# Patient Record
Sex: MALE | URBAN - METROPOLITAN AREA
[De-identification: names, ages, dates, MRNs, and addresses within clinical notes are randomized per-mention and may not be internally consistent; named-entity substitution may affect disease eponyms.]

---

## 2021-05-18 ENCOUNTER — APPOINTMENT (RX ONLY)
Dept: URBAN - METROPOLITAN AREA CLINIC 111 | Facility: CLINIC | Age: 26
Setting detail: DERMATOLOGY
End: 2021-05-18

## 2021-05-18 DIAGNOSIS — L20.89 OTHER ATOPIC DERMATITIS: ICD-10-CM

## 2021-05-18 DIAGNOSIS — L08.9 LOCAL INFECTION OF THE SKIN AND SUBCUTANEOUS TISSUE, UNSPECIFIED: ICD-10-CM

## 2021-05-18 DIAGNOSIS — L21.8 OTHER SEBORRHEIC DERMATITIS: ICD-10-CM

## 2021-05-18 PROBLEM — L20.84 INTRINSIC (ALLERGIC) ECZEMA: Status: ACTIVE | Noted: 2021-05-18

## 2021-05-18 PROCEDURE — ? ORDER TESTS

## 2021-05-18 PROCEDURE — 99203 OFFICE O/P NEW LOW 30 MIN: CPT

## 2021-05-18 PROCEDURE — ? DIAGNOSIS COMMENT

## 2021-05-18 PROCEDURE — ? COUNSELING

## 2021-05-18 PROCEDURE — ? PRESCRIPTION

## 2021-05-18 PROCEDURE — ? PRESCRIPTION MEDICATION MANAGEMENT

## 2021-05-18 RX ORDER — CICLOPIROX 10 MG/.96ML
SHAMPOO TOPICAL
Qty: 1 | Refills: 11 | Status: ERX | COMMUNITY
Start: 2021-05-18

## 2021-05-18 RX ORDER — HYDROCORTISONE, IODOQUINOL 10; 10 MG/G; MG/G
CREAM TOPICAL
Qty: 1 | Refills: 1 | Status: ERX | COMMUNITY
Start: 2021-05-18

## 2021-05-18 RX ORDER — MOMETASONE FUROATE 1 MG/G
OINTMENT TOPICAL
Qty: 1 | Refills: 2 | Status: ERX | COMMUNITY
Start: 2021-05-18

## 2021-05-18 RX ADMIN — MOMETASONE FUROATE: 1 OINTMENT TOPICAL at 00:00

## 2021-05-18 RX ADMIN — HYDROCORTISONE, IODOQUINOL: 10; 10 CREAM TOPICAL at 00:00

## 2021-05-18 RX ADMIN — CICLOPIROX: 10 SHAMPOO TOPICAL at 00:00

## 2021-05-18 ASSESSMENT — LOCATION DETAILED DESCRIPTION DERM
LOCATION DETAILED: GENITALIA
LOCATION DETAILED: RIGHT MEDIAL FRONTAL SCALP
LOCATION DETAILED: RIGHT ULNAR DORSAL HAND
LOCATION DETAILED: RIGHT CHIN
LOCATION DETAILED: LEFT CHIN

## 2021-05-18 ASSESSMENT — LOCATION SIMPLE DESCRIPTION DERM
LOCATION SIMPLE: GENITALIA
LOCATION SIMPLE: RIGHT HAND
LOCATION SIMPLE: CHIN
LOCATION SIMPLE: RIGHT SCALP

## 2021-05-18 ASSESSMENT — LOCATION ZONE DERM
LOCATION ZONE: FACE
LOCATION ZONE: HAND
LOCATION ZONE: SCALP
LOCATION ZONE: GENITALIA

## 2021-05-18 NOTE — HPI: RASH
What Type Of Note Output Would You Prefer (Optional)?: Bullet Format
How Severe Is Your Rash?: moderate
Is This A New Presentation, Or A Follow-Up?: Rash
Additional History: Patient believes rash to be jock itch.
Additional History: Patient believes rash is due to mask.
Additional History: Patient was diagnosed with eczema as a child and would like to discuss treatments for flares.

## 2021-05-18 NOTE — PROCEDURE: DIAGNOSIS COMMENT
Render Risk Assessment In Note?: no
Comment: Likely seborrheic dermatitis with secondary infection
Detail Level: Simple

## 2021-05-18 NOTE — PROCEDURE: ORDER TESTS
Bill For Surgical Tray: no
Billing Type: Third-Party Bill
Performing Laboratory: [id number]
Expected Date Of Service: 05/18/2021

## 2021-05-18 NOTE — PROCEDURE: PRESCRIPTION MEDICATION MANAGEMENT
Initiate Treatment: -\\nUse non soap cleansers in the genital area\\n\\nHydrocortisone/iodoquinol: apply to affected areas (genitals and chin) BID until resolved. Repeat prn for flares.\\n\\nMometasone ointment: Apply to affected areas (hands) twice daily for 2 weeks on, 1 week off. Repeat prn for flares. Avoid face/groin/armpits.
Render In Strict Bullet Format?: No
Detail Level: Zone
Initiate Treatment: -\\nHydrocortisone/iodoquinol: apply to affected areas BID until resolved. Repeat prn for flares.
Initiate Treatment: -\\nCiclopirox shampoo: Use as scalp and face wash for QD until improved, then BIW-TIW for maintenance. Let sit 2-5 minutes prior to rinsing.

## 2021-11-18 ENCOUNTER — APPOINTMENT (RX ONLY)
Dept: URBAN - METROPOLITAN AREA CLINIC 111 | Facility: CLINIC | Age: 26
Setting detail: DERMATOLOGY
End: 2021-11-18

## 2021-11-18 DIAGNOSIS — L21.8 OTHER SEBORRHEIC DERMATITIS: ICD-10-CM

## 2021-11-18 DIAGNOSIS — L20.89 OTHER ATOPIC DERMATITIS: ICD-10-CM | Status: INADEQUATELY CONTROLLED

## 2021-11-18 PROBLEM — L20.84 INTRINSIC (ALLERGIC) ECZEMA: Status: ACTIVE | Noted: 2021-11-18

## 2021-11-18 PROCEDURE — 99214 OFFICE O/P EST MOD 30 MIN: CPT

## 2021-11-18 PROCEDURE — ? COUNSELING

## 2021-11-18 PROCEDURE — ? PRESCRIPTION

## 2021-11-18 PROCEDURE — ? PRESCRIPTION MEDICATION MANAGEMENT

## 2021-11-18 RX ORDER — TRIAMCINOLONE ACETONIDE 1 MG/G
CREAM TOPICAL
Qty: 80 | Refills: 1 | Status: ERX | COMMUNITY
Start: 2021-11-18

## 2021-11-18 RX ORDER — CLOBETASOL PROPIONATE 0.5 MG/ML
SOLUTION TOPICAL BID
Qty: 50 | Refills: 1 | Status: ERX | COMMUNITY
Start: 2021-11-18

## 2021-11-18 RX ORDER — KETOCONAZOLE 20 MG/ML
SHAMPOO, SUSPENSION TOPICAL
Qty: 120 | Refills: 11 | Status: ERX | COMMUNITY
Start: 2021-11-18

## 2021-11-18 RX ORDER — HYDROXYZINE HYDROCHLORIDE 25 MG/1
TABLET, FILM COATED ORAL
Qty: 60 | Refills: 1 | Status: ERX | COMMUNITY
Start: 2021-11-18

## 2021-11-18 RX ORDER — TACROLIMUS 1 MG/G
OINTMENT TOPICAL
Qty: 30 | Refills: 2 | Status: ERX | COMMUNITY
Start: 2021-11-18

## 2021-11-18 RX ADMIN — TACROLIMUS: 1 OINTMENT TOPICAL at 00:00

## 2021-11-18 RX ADMIN — HYDROXYZINE HYDROCHLORIDE: 25 TABLET, FILM COATED ORAL at 00:00

## 2021-11-18 RX ADMIN — TRIAMCINOLONE ACETONIDE: 1 CREAM TOPICAL at 00:00

## 2021-11-18 RX ADMIN — CLOBETASOL PROPIONATE: 0.5 SOLUTION TOPICAL at 00:00

## 2021-11-18 RX ADMIN — KETOCONAZOLE: 20 SHAMPOO, SUSPENSION TOPICAL at 00:00

## 2021-11-18 ASSESSMENT — LOCATION SIMPLE DESCRIPTION DERM
LOCATION SIMPLE: ABDOMEN
LOCATION SIMPLE: RIGHT ELBOW
LOCATION SIMPLE: LEFT FOREARM
LOCATION SIMPLE: LEFT ANTERIOR NECK

## 2021-11-18 ASSESSMENT — LOCATION DETAILED DESCRIPTION DERM
LOCATION DETAILED: LEFT VENTRAL PROXIMAL FOREARM
LOCATION DETAILED: RIGHT ANTECUBITAL SKIN
LOCATION DETAILED: LEFT SUPERIOR ANTERIOR NECK
LOCATION DETAILED: PERIUMBILICAL SKIN

## 2021-11-18 ASSESSMENT — LOCATION ZONE DERM
LOCATION ZONE: ARM
LOCATION ZONE: TRUNK
LOCATION ZONE: NECK

## 2021-11-18 NOTE — PROCEDURE: PRESCRIPTION MEDICATION MANAGEMENT
Plan: Alternate washing scalp, behind ears, beard area daily between ketoconazole and ciclopirox shampoo. Let sit for 5 minutes prior to washing.\\n\\nApply Clobetasol solution on the scalp 2 x daily 2 weeks on 1 week off, repeat as needed, when flaring
Render In Strict Bullet Format?: Yes
Detail Level: Zone
Plan: Body: Triamcinolone twice daily 2 weeks on 1 week off, repeat as needed.\\n\\nFace/neck: tacrolimus twice daily, as needed.\\n\\nTake Zyrtec each morning.\\n\\nTake Hydroxyzine 1-2 tablets 1 hour before bed, as needed, for itching/allergy flares\\n\\nMoisturize twice daily, especially within 2 minutes after bathing - CeraVe Cream.\\n\\nWash with CeraVe cleanser. \\n\\nAvoid long, hot showers or baths.

## 2021-11-30 ENCOUNTER — RX ONLY (OUTPATIENT)
Age: 26
Setting detail: RX ONLY
End: 2021-11-30

## 2021-11-30 RX ORDER — CICLOPIROX 10 MG/.96ML
SHAMPOO TOPICAL
Qty: 120 | Refills: 11 | Status: ERX

## 2022-01-19 ENCOUNTER — APPOINTMENT (RX ONLY)
Dept: URBAN - METROPOLITAN AREA CLINIC 111 | Facility: CLINIC | Age: 27
Setting detail: DERMATOLOGY
End: 2022-01-19

## 2022-01-19 DIAGNOSIS — L21.8 OTHER SEBORRHEIC DERMATITIS: ICD-10-CM | Status: INADEQUATELY CONTROLLED

## 2022-01-19 DIAGNOSIS — L20.89 OTHER ATOPIC DERMATITIS: ICD-10-CM

## 2022-01-19 DIAGNOSIS — L23.9 ALLERGIC CONTACT DERMATITIS, UNSPECIFIED CAUSE: ICD-10-CM

## 2022-01-19 PROBLEM — L20.84 INTRINSIC (ALLERGIC) ECZEMA: Status: ACTIVE | Noted: 2022-01-19

## 2022-01-19 PROCEDURE — ? COUNSELING

## 2022-01-19 PROCEDURE — ? DIAGNOSIS COMMENT

## 2022-01-19 PROCEDURE — ? PRESCRIPTION MEDICATION MANAGEMENT

## 2022-01-19 PROCEDURE — 99214 OFFICE O/P EST MOD 30 MIN: CPT

## 2022-01-19 PROCEDURE — ? PRESCRIPTION

## 2022-01-19 RX ORDER — FLUOCINOLONE ACETONIDE 0.11 MG/ML
OIL TOPICAL
Qty: 118.28 | Refills: 2 | Status: ERX | COMMUNITY
Start: 2022-01-19

## 2022-01-19 RX ADMIN — FLUOCINOLONE ACETONIDE: 0.11 OIL TOPICAL at 00:00

## 2022-01-19 ASSESSMENT — LOCATION ZONE DERM
LOCATION ZONE: SCALP
LOCATION ZONE: ARM
LOCATION ZONE: TRUNK
LOCATION ZONE: NECK

## 2022-01-19 ASSESSMENT — LOCATION SIMPLE DESCRIPTION DERM
LOCATION SIMPLE: RIGHT ELBOW
LOCATION SIMPLE: LEFT FOREARM
LOCATION SIMPLE: HAIR
LOCATION SIMPLE: ABDOMEN
LOCATION SIMPLE: LEFT ANTERIOR NECK

## 2022-01-19 ASSESSMENT — LOCATION DETAILED DESCRIPTION DERM
LOCATION DETAILED: LEFT SUPERIOR ANTERIOR NECK
LOCATION DETAILED: HAIR
LOCATION DETAILED: PERIUMBILICAL SKIN
LOCATION DETAILED: LEFT VENTRAL PROXIMAL FOREARM
LOCATION DETAILED: RIGHT ANTECUBITAL SKIN

## 2022-01-19 NOTE — PROCEDURE: DIAGNOSIS COMMENT
Comment: Planning to obtain benefits for patch testing to r/o triggers
Detail Level: Simple
Render Risk Assessment In Note?: no
Comment: Will check benefits for patch testing.

## 2022-01-19 NOTE — PROCEDURE: PRESCRIPTION MEDICATION MANAGEMENT
Render In Strict Bullet Format?: Yes
Detail Level: Zone
Continue Regimen: -\\n- Triamcinolone: apply to affected areas of body twice daily 2 weeks on 1 week off, repeat as needed. Avoid face/groin/armpits.  \\n- Mometasone: apply to affected areas of hands twice daily 2 weeks on 1 week off, repeat as needed. Avoid face/groin/armpits.  \\n- Tacrolimus: apply to affected areas  twice daily, as needed.\\n- Take Zyrtec each morning.\\n- Take Hydroxyzine 1-2 tablets 1 hour before bed, as needed, for itching/allergy flares\\n-  Moisturize twice daily, especially within 2 minutes after bathing - CeraVe Cream.\\n- Wash with CeraVe cleanser. \\n- Avoid long, hot showers or baths.
Initiate Treatment: -\\n- OTC t sal shampoo: wash scalp BIW-TIW. Let it sit for 2-5 minutes prior to rinsing and alternate with selenium sulfide shampoo\\n- Dermasmoothe: Drop into scalp and rub in nightly, cover with shower cap, wash off in morning. Repeat 2-3x weekly as needed for flares.

## 2022-03-02 ENCOUNTER — APPOINTMENT (RX ONLY)
Dept: URBAN - METROPOLITAN AREA CLINIC 111 | Facility: CLINIC | Age: 27
Setting detail: DERMATOLOGY
End: 2022-03-02

## 2022-03-02 DIAGNOSIS — L259 CONTACT DERMATITIS AND OTHER ECZEMA, UNSPECIFIED CAUSE: ICD-10-CM

## 2022-03-02 PROBLEM — L23.9 ALLERGIC CONTACT DERMATITIS, UNSPECIFIED CAUSE: Status: ACTIVE | Noted: 2022-03-02

## 2022-03-02 PROCEDURE — ? PATCH TESTING

## 2022-03-02 PROCEDURE — 95044 PATCH/APPLICATION TESTS: CPT

## 2022-03-02 NOTE — PROCEDURE: PATCH TESTING
Detail Level: Zone
Consent: Verbal consent obtained, risks reviewed including but not limited to rash, itching, allergic reaction, systemic rash, remote possiblity of anaphylaxis to allergen.
Post-Care Instructions: PATIENT INSTRUCTIONS\\nPatch Testing\\n\\nPatch Testing Process\\n    • You are being patch tested to determine if your rash is caused by an allergic reaction to specific substances (allergens such as chemicals, minerals, or medications) that may have come into contact with your skin. \\n    • We have tested the most common allergens in [place]; since the test cannot be comprehensive, not everyone will get conclusive results. \\n    • The test process will involve at least two visits to the clinic after the test is applied, to look for various degrees of redness, swelling, or even blistering at the test sites.\\nWhat to Expect\\n    • We have applied strips of tape with small amounts of suspect substances onto your back. This may occasionally feel uncomfortable, and you may develop itching under one or more of the chambers. \\n    • Try to avoid scratching, as itching is normally an indication of a positive reaction and scratching might alter the test results. If pain occurs, call the clinic. \\n    • Keep the area dry until the patches are removed in 2-3 days. You may take a sponge bath, but DO NOT get your back wet. \\n    • Avoid heavy and physical exercises that could cause excessive perspiration and detachment of the test unit. \\n    • If you notice patches peeling or loosening from the skin, have someone apply pressure to the adhesive portion of the strips. If necessary, you can also apply additional tape to the edges of the chamber units. \\n    • Abstain from taking oral or injected steroid medications; these may invalidate the test. \\n    • Avoid prolonged sun exposure to the test area.\\nNext Steps\\n    • The chamber units will be removed after about 2 days for the initial reading to assess early reactions at the test sites and nicolás them with ink. \\n    • A second reading will be scheduled in about 2 additional days to assess final test results. \\n    • Continue to watch the test area over the next week in the event there is a delayed reaction on the skin.\\n**NOTE:  Surgical marker used on patch test may transfer to clothing or sheets.\\nResults\\n    • Upon completion of this procedure, you will be provided with written results and advised of any skin allergies you may have. \\n    • We will provide you with the various names of the positive allergens and where to find them in your environment. \\n    • Isolating your skin allergens and avoiding exposure to them can help reduce the risk of future allergic reactions. \\n    • Return to the clinic if your skin condition is persistent or recurrent, and your doctor will provide treatment options, as needed.\\n\\nRevised 7/1/2015
Number Of Patches (Maximum Allowable Per Dos By Cms Is 90): 80

## 2022-03-02 NOTE — HPI: RASH (ALLERGIC CONTACT DERMATITIS)
How Severe Is Your Rash?: moderate
Is This A New Presentation, Or A Follow-Up?: Follow Up Allergic Contact Dermatitis
Additional History: Patient presents for patch test application

## 2022-03-04 ENCOUNTER — APPOINTMENT (RX ONLY)
Dept: URBAN - METROPOLITAN AREA CLINIC 111 | Facility: CLINIC | Age: 27
Setting detail: DERMATOLOGY
End: 2022-03-04

## 2022-03-04 DIAGNOSIS — L259 CONTACT DERMATITIS AND OTHER ECZEMA, UNSPECIFIED CAUSE: ICD-10-CM

## 2022-03-04 PROBLEM — L23.9 ALLERGIC CONTACT DERMATITIS, UNSPECIFIED CAUSE: Status: ACTIVE | Noted: 2022-03-04

## 2022-03-04 PROCEDURE — ? ADDITIONAL NOTES

## 2022-03-04 PROCEDURE — ? PRESCRIPTION MEDICATION MANAGEMENT

## 2022-03-04 PROCEDURE — ? SEPARATE AND IDENTIFIABLE DOCUMENTATION

## 2022-03-04 PROCEDURE — ? NORTH AMERICAN 80 PATCH TEST READING

## 2022-03-04 PROCEDURE — 99213 OFFICE O/P EST LOW 20 MIN: CPT

## 2022-03-04 NOTE — PROCEDURE: NORTH AMERICAN 80 PATCH TEST READING
Allergen 88 Reaction: no reaction
Name Of Allergen 32: Thimerosal (Merthiolate)
Name Of Allergen 40: Glyceryl monothioglycolate (GMTG)
Name Of Allergen 78: Methylisothiazolinone + Methylchloroisothiazolinone/(Cl+-Me-isothiazolinone(Graciela )
Name Of Allergen 8: Carba mix
Name Of Allergen 52: Benzyl salicylate
Name Of Allergen 20: Nickelsulfate hexahydrate
Name Of Allergen 66: Compositae Mix II
Name Of Allergen 59: Isopropyl myristate
Name Of Allergen 27: Methyldibromo glutaronitrile (MDBGN)
Name Of Allergen 3: Colophonium/(Colophony)
Name Of Allergen 47: Triethanolamine
Name Of Allergen 15: 6-kbyi-Vidrurdeldrneskyzcnkuvo resin
Name Of Allergen 73: Ethylhexyl Salicylate
Name Of Allergen 34: Benzophenone-3/ (2-Hydroxy-4-methoxybenzophenone)
Name Of Allergen 10: Thiuram mix
Name Of Allergen 54: Methylisothiazolinone
Name Of Allergen 22: Tocopherol/ (DL alpha tocopherol)
Name Of Allergen 68: Fusidic acid sodium salt
Name Of Allergen 42: Methyl methacrylate
Detail Level: Zone
Name Of Allergen 35: Chloroxylenol (PCMX) / (4-Chloro-3,5-xylenol(PCMX)
Name Of Allergen 43: Cobalt (II) chloride hexahydrate
Name Of Allergen 11: Clobetasol-17-propionate
Name Of Allergen 23: Bacitracin
Name Of Allergen 55: Hema (2-Hydroxyethyl methacrylate)
Name Of Allergen 69: Dibucaine hydrochloride
Show Negative Results In The Note?: No
Name Of Allergen 18: Potassium dichromate
Name Of Allergen 30: 2-Bromo-2-nitropropane-l,3-diol (Brononol)
Name Of Allergen 80: Oleamidopropyl dimethylamine
Name Of Allergen 62: Polysorbate 80 / (Polyoxyethylenesorbitanmonooleate(Tween 80)
Name Of Allergen 64: 2-n-Octyl-4-isothiazolin-3-one
Name Of Allergen 76: Cocamidopropylbetaine
Name Of Allergen 50: Fragrance Mix II
Name Of Allergen 6: Cinnamal/(Cinnamic aldehyde)
Name Of Allergen 38: Gold(I)sodium thiosulfate dihydrate
Name Of Allergen 13: Epoxy resin Bisphenol A
Name Of Allergen 45: B-033A Budesonide
Name Of Allergen 57: Cananga odorata oil/(Ylang-Ylang oil)
Name Of Allergen 25: Disperse Orange 3
Name Of Allergen 71: Isoamyl-p-methoxycinnamate
Name Of Allergen 1: Benzocaine
Name Of Allergen 26: Paraben mix
Name Of Allergen 2: 2-Mercaptobenzothiazole(MBT)
Name Of Allergen 72: Hydroxyisohexyl 3-Cyclohexene Carboxaldehyde (Lyral)
Name Of Allergen 58: Benzyl alcohol
Name Of Allergen 14: Quaternium-15(Dowicil 200)/(1-(30Chloroallyl)-3,5,5-alzryn-3-azoniaadamantane chloride)
Name Of Allergen 46: Cocamide RITU/(Coconut diethanolamide)
Allergen 20 Reaction: 3+
Name Of Allergen 53: Decyl Glucoside
Name Of Allergen 21: Diazolidinylurea (Germall II)
Name Of Allergen 33: Propolis
Name Of Allergen 67: Lidocaine
Name Of Allergen 41: Toluenesulfonamide formaldehyde resin
Name Of Allergen 9: Neomycin sulfate
Name Of Allergen 48: Textile dye mix
Name Of Allergen 16: Mercapto mix
Name Of Allergen 28: Fragrance mix
Name Of Allergen 60: Hydroperoxides of Limonene
Name Of Allergen 74: Hydroperoxides of Linalool
Name Of Allergen 4: p-Phenylenediamine(PPD)
Name Of Allergen 36: Ethyleneurea, melamine formaldehyde mix
What Reading Time Point?: 48 hour
Name Of Allergen 29: Glutaral / (Glutaraldehyde)
Name Of Allergen 61: Desoximetasone
Name Of Allergen 79: Propylene glycol
Name Of Allergen 75: Amidoamine
Name Of Allergen 49: Tea Tree Oil
Name Of Allergen 5: Imidazolidinyl urea Germall 115
Name Of Allergen 37: 2-tert-Butyl-4-methoxyphenol (BHA)
Name Of Allergen 17: N,N-Diphenylguanidine
Number Of Patches Read: 80
Name Of Allergen 63: Iodopropynyl butyl carbamate
Name Of Allergen 24: Mixed dialkyl thiourea
Name Of Allergen 56: DMDM Hydantoin
Name Of Allergen 44: Tixocortol-21-pivalate
Name Of Allergen 70: Benzoylperoxide
Name Of Allergen 12: Ethylenediamine dihydrochloride
Name Of Allergen 51: Disperse Yellow 3
Name Of Allergen 7: Amerchol L101
Name Of Allergen 19: Myroxylon pereirae resin/(Balsam [place])
Name Of Allergen 31: Sesquiterpene lactone mix
Name Of Allergen 39: Ethyl acrylate
Name Of Allergen 77: Formaldehyde
Name Of Allergen 65: Disperse Blue 106/124 Mix

## 2022-03-07 ENCOUNTER — APPOINTMENT (RX ONLY)
Dept: URBAN - METROPOLITAN AREA CLINIC 111 | Facility: CLINIC | Age: 27
Setting detail: DERMATOLOGY
End: 2022-03-07

## 2022-03-07 DIAGNOSIS — L259 CONTACT DERMATITIS AND OTHER ECZEMA, UNSPECIFIED CAUSE: ICD-10-CM

## 2022-03-07 PROBLEM — L23.9 ALLERGIC CONTACT DERMATITIS, UNSPECIFIED CAUSE: Status: ACTIVE | Noted: 2022-03-07

## 2022-03-07 PROCEDURE — ? PRESCRIPTION MEDICATION MANAGEMENT

## 2022-03-07 PROCEDURE — ? ADDITIONAL NOTES

## 2022-03-07 PROCEDURE — 99213 OFFICE O/P EST LOW 20 MIN: CPT

## 2022-03-07 PROCEDURE — ? NORTH AMERICAN 80 PATCH TEST READING

## 2022-03-07 NOTE — PROCEDURE: NORTH AMERICAN 80 PATCH TEST READING
Name Of Allergen 32: Thimerosal (Merthiolate)
Name Of Allergen 44: Tixocortol-21-pivalate
Name Of Allergen 56: DMDM Hydantoin
Name Of Allergen 12: Ethylenediamine dihydrochloride
Name Of Allergen 79: Propylene glycol
Allergen 38 Reaction: no reaction
Name Of Allergen 20: Nickelsulfate hexahydrate
Name Of Allergen 67: Lidocaine
Name Of Allergen 51: Disperse Yellow 3
Name Of Allergen 7: Amerchol L101
Name Of Allergen 62: Polysorbate 80 / (Polyoxyethylenesorbitanmonooleate(Tween 80)
Name Of Allergen 27: Methyldibromo glutaronitrile (MDBGN)
Name Of Allergen 39: Ethyl acrylate
Name Of Allergen 74: Hydroperoxides of Linalool
Name Of Allergen 2: 2-Mercaptobenzothiazole(MBT)
Name Of Allergen 46: Cocamide RITU/(Coconut diethanolamide)
Name Of Allergen 14: Quaternium-15(Dowicil 200)/(1-(30Chloroallyl)-3,5,3-sibmkp-4-azoniaadamantane chloride)
Name Of Allergen 58: Benzyl alcohol
Name Of Allergen 22: Tocopherol/ (DL alpha tocopherol)
Name Of Allergen 34: Benzophenone-3/ (2-Hydroxy-4-methoxybenzophenone)
Name Of Allergen 69: Dibucaine hydrochloride
Name Of Allergen 35: Chloroxylenol (PCMX) / (4-Chloro-3,5-xylenol(PCMX)
Name Of Allergen 47: Triethanolamine
Name Of Allergen 3: Colophonium/(Colophony)
Name Of Allergen 70: Benzoylperoxide
Name Of Allergen 23: Bacitracin
Detail Level: Zone
Name Of Allergen 10: Thiuram mix
Name Of Allergen 54: Methylisothiazolinone
Name Of Allergen 30: 2-Bromo-2-nitropropane-l,3-diol (Brononol)
Name Of Allergen 42: Methyl methacrylate
Name Of Allergen 77: Formaldehyde
Name Of Allergen 65: Disperse Blue 106/124 Mix
Name Of Allergen 49: Tea Tree Oil
Name Of Allergen 5: Imidazolidinyl urea Germall 115
Name Of Allergen 61: Desoximetasone
Name Of Allergen 17: N,N-Diphenylguanidine
What Reading Time Point?: 120 hour
Name Of Allergen 72: Hydroxyisohexyl 3-Cyclohexene Carboxaldehyde (Lyral)
Number Of Patches Read: 80
Allergen 43 Reaction: 3+
Name Of Allergen 25: Disperse Orange 3
Name Of Allergen 37: 2-tert-Butyl-4-methoxyphenol (BHA)
Name Of Allergen 38: Gold(I)sodium thiosulfate dihydrate
Name Of Allergen 6: Cinnamal/(Cinnamic aldehyde)
Name Of Allergen 50: Fragrance Mix II
Allergen 20 Reaction: 2+
Show Allergen Counseling In The Note?: No
Name Of Allergen 73: Ethylhexyl Salicylate
Name Of Allergen 26: Paraben mix
Name Of Allergen 57: Cananga odorata oil/(Ylang-Ylang oil)
Name Of Allergen 13: Epoxy resin Bisphenol A
Name Of Allergen 68: Fusidic acid sodium salt
Name Of Allergen 33: Propolis
Name Of Allergen 1: Benzocaine
Name Of Allergen 45: B-033A Budesonide
Name Of Allergen 80: Oleamidopropyl dimethylamine
Name Of Allergen 21: Diazolidinylurea (Germall II)
Name Of Allergen 40: Glyceryl monothioglycolate (GMTG)
Name Of Allergen 52: Benzyl salicylate
Name Of Allergen 8: Carba mix
Name Of Allergen 75: Amidoamine
Name Of Allergen 63: Iodopropynyl butyl carbamate
Name Of Allergen 28: Fragrance mix
Name Of Allergen 15: 2-zdlq-Cgwpirnwtofdifkqpczjqja resin
Name Of Allergen 59: Isopropyl myristate
Name Of Allergen 41: Toluenesulfonamide formaldehyde resin
Name Of Allergen 9: Neomycin sulfate
Name Of Allergen 53: Decyl Glucoside
Name Of Allergen 76: Cocamidopropylbetaine
Name Of Allergen 64: 2-n-Octyl-4-isothiazolin-3-one
Name Of Allergen 29: Glutaral / (Glutaraldehyde)
Name Of Allergen 60: Hydroperoxides of Limonene
Name Of Allergen 16: Mercapto mix
Name Of Allergen 24: Mixed dialkyl thiourea
Name Of Allergen 71: Isoamyl-p-methoxycinnamate
Name Of Allergen 36: Ethyleneurea, melamine formaldehyde mix
Name Of Allergen 48: Textile dye mix
Name Of Allergen 4: p-Phenylenediamine(PPD)
Name Of Allergen 43: Cobalt (II) chloride hexahydrate
Name Of Allergen 55: Hema (2-Hydroxyethyl methacrylate)
Name Of Allergen 11: Clobetasol-17-propionate
Name Of Allergen 19: Myroxylon pereirae resin/(Balsam [place])
Name Of Allergen 66: Compositae Mix II
Name Of Allergen 31: Sesquiterpene lactone mix
Name Of Allergen 18: Potassium dichromate
Name Of Allergen 78: Methylisothiazolinone + Methylchloroisothiazolinone/(Cl+-Me-isothiazolinone(Graciela )

## 2022-03-07 NOTE — PROCEDURE: ADDITIONAL NOTES
Additional Notes: Advised patient to paint belt buckle with clear nail polish and avoid nickel in jewelry and watch products. Discussed starting a low nickel diet for at least 2-3 months and to avoid jewelry with pure yun silver and gold
Detail Level: Simple
Render Risk Assessment In Note?: no

## 2022-06-17 ENCOUNTER — RX ONLY (OUTPATIENT)
Age: 27
Setting detail: RX ONLY
End: 2022-06-17

## 2022-06-17 RX ORDER — TRIAMCINOLONE ACETONIDE 1 MG/G
CREAM TOPICAL
Qty: 80 | Refills: 1 | Status: ERX

## 2023-10-24 NOTE — PROCEDURE: COUNSELING
Detail Level: Simple
Detail Level: Zone
Detail Level: Detailed
Alert and oriented to person, place and time/Patient baseline mental status/Awake